# Patient Record
Sex: MALE | Race: WHITE | Employment: OTHER | URBAN - METROPOLITAN AREA
[De-identification: names, ages, dates, MRNs, and addresses within clinical notes are randomized per-mention and may not be internally consistent; named-entity substitution may affect disease eponyms.]

---

## 2022-07-06 ENCOUNTER — OFFICE VISIT (OUTPATIENT)
Dept: SURGERY | Age: 57
End: 2022-07-06
Payer: COMMERCIAL

## 2022-07-06 VITALS
OXYGEN SATURATION: 96 % | HEIGHT: 65 IN | BODY MASS INDEX: 29.16 KG/M2 | TEMPERATURE: 98 F | WEIGHT: 175 LBS | HEART RATE: 76 BPM | DIASTOLIC BLOOD PRESSURE: 85 MMHG | SYSTOLIC BLOOD PRESSURE: 134 MMHG

## 2022-07-06 DIAGNOSIS — K60.3 ANAL FISTULA: Primary | ICD-10-CM

## 2022-07-06 DIAGNOSIS — D12.5 ADENOMATOUS POLYP OF SIGMOID COLON: ICD-10-CM

## 2022-07-06 PROCEDURE — G8419 CALC BMI OUT NRM PARAM NOF/U: HCPCS | Performed by: SURGERY

## 2022-07-06 PROCEDURE — 1036F TOBACCO NON-USER: CPT | Performed by: SURGERY

## 2022-07-06 PROCEDURE — 99204 OFFICE O/P NEW MOD 45 MIN: CPT | Performed by: SURGERY

## 2022-07-06 PROCEDURE — G8427 DOCREV CUR MEDS BY ELIG CLIN: HCPCS | Performed by: SURGERY

## 2022-07-06 PROCEDURE — 3017F COLORECTAL CA SCREEN DOC REV: CPT | Performed by: SURGERY

## 2022-07-06 RX ORDER — OMEGA-3S/DHA/EPA/FISH OIL/D3 300MG-1000
400 CAPSULE ORAL DAILY
COMMUNITY

## 2022-07-06 RX ORDER — ATORVASTATIN CALCIUM 20 MG/1
TABLET, FILM COATED ORAL
COMMUNITY
Start: 2022-05-20

## 2022-07-06 RX ORDER — VITAMIN B COMPLEX
1 CAPSULE ORAL DAILY
COMMUNITY

## 2022-07-06 NOTE — LETTER
[facility] [address] [place] [address] [phone]  f [phone]    Jaquelin Porter MD                        SURGERY ORDER   -- Time of order -- 22    7:46 AM    Facility:   [facility]. # _________________                                                                                    Scheduled By:____________                  Surgery Date & Time: 2022 at 8:15am                                      Pt arrival: 6:15AM                                                                                      Patient Name:  Alvaro Thompson     :  1965     PCP:  Gabe Gregory DO      Home Ph:    [phone] (home) [phone] (work)                                                    PROCEDURE:  Exam under anesthesia, fistulotomy versus seton placement, possible rectal biopsy 76619, 55714, 40004, 17355    DIAGNOSIS:      ICD-10-CM    1. Anal fistula  K60.3    2. Adenomatous polyp of sigmoid colon  D12.5        Anesthesia: _General    Time Needed:  15 minutes    Pt Position:  prone/eduar-knife    Bowel Prep: fleets enema         Outpatient _x__ Admit ___                  Pre-Op to be done by: __PCP (if able)___    Cardiac Clearance Done by: ____N/A____    Medications to be stopped 5 days before surgery: ____n/a_____    Additional / Special Orders:                                                                                                                                                                                                        Jaquelin Porter MD  Insurance:                                ID #                                        Ph #     (secondary ?)       Date called ______        Manny Romeor to: _____ @ _____           Precert Needed?  Yes  /  No    PreAuth # & Details _______________________________________________________                        ______ Aniya Law to [facility] Insurance Verification _[phone]_      Post Op_________              ____Inst given                 _____ Bary Bare to Pt/Spouse

## 2022-07-07 RX ORDER — SODIUM CHLORIDE 0.9 % (FLUSH) 0.9 %
5-40 SYRINGE (ML) INJECTION PRN
Status: CANCELLED | OUTPATIENT
Start: 2022-07-07

## 2022-07-07 RX ORDER — SODIUM CHLORIDE 9 MG/ML
INJECTION, SOLUTION INTRAVENOUS PRN
Status: CANCELLED | OUTPATIENT
Start: 2022-07-07

## 2022-07-07 RX ORDER — SODIUM CHLORIDE 0.9 % (FLUSH) 0.9 %
5-40 SYRINGE (ML) INJECTION EVERY 12 HOURS SCHEDULED
Status: CANCELLED | OUTPATIENT
Start: 2022-07-07

## 2022-07-07 NOTE — PROGRESS NOTES
provided as appropriate. REVIEW OF SYSTEMS:    Pertinent positives and negatives are mentioned in the HPI above. Otherwise, all other systems were reviewed and negative. Objective:     Physical Exam   /85   Pulse 76   Temp 98 °F (36.7 °C) (Infrared)   Ht 5' 4.5\" (1.638 m)   Wt 175 lb (79.4 kg)   SpO2 96%   BMI 29.57 kg/m²   Constitutional: Appears well-developed and well-nourished. Grooming appropriate. No gross deformities. Body mass index is 29.57 kg/m². Eyes: No scleral icterus. Conjunctiva/lids normal. Vision intact grossly. Pupils equal/symmetric, reactive bilaterally. ENT: External ears/nose without defect, scars, or masses. Hearing grossly intact. No facial deformity. Lips normal, normal dentition. Neck: No masses. Trachea midline. No crepitus. Thyroid not enlarged. Cardiovascular: Normal rate. No peripheral edema. Abdominal aorta normal size to palpation. Pulmonary/Chest: Effort normal. No respiratory distress. No wheezes. No use of accessory muscles. Musculoskeletal: Normal range of motion x all 4 extremities and head/neck, without deformity, pain, or crepitus, with normal strength and tone. Normal gait. Nails without clubbing or cyanosis. Neurological: Alert and oriented to person, place, and time. No gross deficits. Sensation intact. Skin: Skin is dry. No rashes noted. No pallor. No induration of nodules. Psychiatric: Normal mood and affect. Behavior normal. Oriented to person, place, and time. Judgment and insight reasonable. Abdominal/wound: Soft, nontender, nondistended    Anorectal Exam: Chaperone present in room throughout exam.  Patient placed in the left lateral position. Buttocks spread. Digital rectal exam performed with lubricated finger. Anoscopy performed. Findings reveal: Posterior midline defect just outside the anal verge, consistent with external side of the fistula. Internal exam a bit limited due to stool.     Labs reviewed: Reviewed outside hospital pathology report  Radiology reviewed: None    Last colonoscopy: 2021, Em [facility] [place], operated by [facility]; reviewed outside hospital colonoscopy report and pathology report      Assessment/Plan:     A/P:  New problem(s) with uncertain prognosis: Anal fistula  Established problem(s): Colon polyp  Additional workup/treatment planned: Examination under anesthesia with fistulotomy versus seton placement  Risk of complications/morbidity: Moderate    I discussed with . Mindy Hurd the pathophysiology, etiology, work-up, treatment options, natural history of anal fistula. Discussed with him it is unlikely that this will resolve without surgical intervention. Discussed with him differences between simple and complex fistulas, including surgical management. Discussed fistulotomy as the mainstay of simple fistulas which is a fairly high resolution rate. Also discussed other options such as seton placement, mucosal advancement flap, lift procedure, in the case of complex fistulas. We also discussed that long-term chronic fistulas left alone over the course of decades can occasionally turn into malignancy. He is interested in surgical intervention, but is unsure about his personal schedule and timing. I will have my  reach out to him early next week. Again discussed that there is no immediate urgency, but he continues to be at risk for recurrent perianal abscesses. I had a discussion with the patient regarding the risks, benefits, and alternatives of the procedure. Risks include, but are not limited to: bleeding, infection, missed lesions, need for additional procedures or operations, thrombosis of external hemorrhoids, urinary retention, pelvic sepsis, and sphincter stretch or damage, which could result in temporary or rarely permanent incontinence to stool or flatus. Anesthesia options discussed. Postoperative pain discussed, and time away from work or usual daily activities discussed.  All questions answered to patient's satisfaction. Continue with current medications    I provided written information in the After Visit Summary AVS Regarding: anal fistula    DISPOSITION:  Surgery when ready    My findings will be relayed to consulting practitioner or PCP via Epic  Note completed using dictation software, please excuse any errors.     Electronically signed by Del Sterling MD on 7/7/2022 at 7:39 AM

## 2022-07-07 NOTE — PATIENT INSTRUCTIONS
ANAL FISTULA: Information and treatment options      A fistula is an abnormal connection between the inside of the anus or rectum and another structure, most commonly the skin around the anus. This results in continuous irritation and inflammation of the tract, which can cause recurrent infections and abscesses/boils. This can also result in pain, bleeding, and difficulty keeping clean. The cause of most fistula disease is unknown. Rarely, it can be a sign of other problems, such as Crohn's disease, or a result from previous radiation or surgical treatments in the anus or rectal region. Most anal fistulas require surgery. Antibiotics are generally not helpful, and creams and suppositories are also ineffective. There are various surgical options, depending on the course of the fistula tract. Usually the best surgical option is not determined until the actual time of surgery, in the operating room. This is determined by estimating how much of the internal and external sphincter muscles are involved in the fistula. The external sphincter muscle is under our conscious control (voluntary) and is the main muscle involved in controlling our defecation (stool control). The internal sphincter is under automatic control, and is much less critical in controlling defecation. Typically, only minimal workup (an office visit and exam) is required before proceeding with an operation, as most fistulas are best evaluated at the time of surgery. Occasionally, an MRI is ordered in the case of recurrent or complex fistula disease. Colonoscopy may be recommended if there is any suspicion for Crohn's Disease, such as chronic diarrhea, rectal bleeding, abdominal pain, unintended weight loss, or if you have a family history of Crohn's Disease or Ulcerative Colitis. If the fistula contains only internal sphincter muscle or no muscle at all, often a procedure called a fistulotomy is performed.  In this procedure, a probe is used to define the exact path, and the tissue and skin on top of the probe is opened up, and the fistula is allowed to heal from the inside out. This may take 2-4 weeks to completely heal, but results in excellent (95%) long term success. If the fistula contains various amount of the external sphincter muscle, other options may be chosen, depending on factors such as your age, gender, previous surgeries, previous sphincter tears or repairs, and ability to heal:  · Seton Placement: In this procedure, a rubber band-like object (\"seton\") is passed through the fistula tract and loosely tied to itself. This is used to prevent further infection and promote drainage. These are typically used as a bridge (for 2-4 months) to a more complex procedure, but can be left in indefinitely if needed. Setons are inert and do not interfere with bowel movements or daily activities. · LIFT (Ligation of Intersphincteric Fistula Tract): In this procedure, an incision is made between the internal and external sphincter muscles without the need to cut either one. The fistula is found, sutured closed, and cut. The skin side of the fistula is left open to heal from the inside out and to allow for drainage during the healing process. Though this procedure does require an incision into normal tissue, it minimizes the risk of damage to the external sphincter muscle, which makes the risk of incontinence very low. Long term success rates range from 60-80%. This procedure is usually preceded by a seton for 2-4 months. · Mucosal Advancement Flap: In this procedure, a small amount of your rectal tissue is freed up and stitched over the internal opening of the fistula. The skin side is left to heal from the inside out and to allow for drainage during healing. There is minimal risk of incontinence with this procedure. Long term success rates range from 60-80%. This procedure is usually preceded by a seton for 2-4 months. · Fistula Plug:  In this procedure, a small plug, made of porcine derivatives is used to fill the space inside the tract, to promote tissue ingrowth to allow it to heal. The plug will eventually dissolve. There is no risk of incontinence as no muscle is cut. Long term success rates range from 40-50%. These procedures are typically done in the operating room with sedation. All are same-day procedures and you will go home a few hours later. How can you increase your chance of successful healing:  · DO NOT smoke or use tobacco products  · If you are diabetic, keep your blood sugars well-controlled  · Avoid constipation and hard bowel movements  · Exercise daily, and maintain a healthy weight    Preparation for Surgery:  · Be sure to ask your doctor about any medications that you take on a regular basis. There may be special instructions regarding any blood thinning medications (such as aspirin, Plavix, or Coumadin), and any medications for diabetes. · Do not eat or drink anything after midnight, with the exception of sips of water with your regular medications the morning of surgery  · Please perform a fleets enema 1-2 hours before your scheduled arrival time. This can be acquired over the counter at most drug stores. We can provide one for you free of charge in the office if needed. · If you have any questions, please call the office at [phone]    Surgery Expectations:  · Please arrange for someone to drive you. You will be receiving sedation and it is illegal to drive yourself home. · Please arrive 2 hours before your scheduled surgery time. · You will go through registration, receive an IV, and meet the anesthesia provider  · You will be offered various anesthetic options, including IV sedation (\"twilight\"), local anesthetic injection, and a spinal injection (this will make you numb from the hips and downward - similar to an epidural used for childbirth). General anesthesia is offered for more complex procedures.   · Your surgeon will see you 10-15 minutes before your procedure to insure all of your questions and concerns have been addressed  · During the procedure, a flexible sigmoidoscopy will be performed - this is similar to a colonoscopy, but much shorter, to examine the inside of the rectum  · Next, retractors will be used to examine the fistula and determine which of the above options is best for you. · You will spend anywhere from 1 to 3 hours in the recovery area to insure sedation has worn off and you are safe to go home. After the procedure:  · You may have some drainage or a small amount of bleeding depending on the specifics of the procedure. · Depending on which procedure was done, you may have pain and discomfort. Please see the POST-OP Instructions on recommendations to control pain. · Keep your stools soft with plenty of fiber, water, and healthy fruits and vegetables. MiraLax and colace can be used as needed. · Warm water soaks (5-10 minutes) or gentle cleansing with a showerhead should be done twice daily and after bowel movements to keep the area clean. · Pathology/biopsy results are typically discussed at your postoperative visit. Risks and potential complications  · Fistula recurrence    · Potential need for future surgery  · Pain  · Rectal bleeding  · Difficulty with urinating (uncommon)  · Temporary changes in your ability to control stool or gas (uncommon)  · Permanent changes in your ability to control stool or gas (rare)  · Infections requiring emergency surgery and colostomy (very rare)    When should you call the office? · You have any questions or concerns. · You don't understand how to prepare for your procedure. · You have excessive bleeding, fever, chills, or inability to urinate  · You need to reschedule or have changed your mind about having the procedure.   · Dr Ming Young office phone # is [phone]  · If you are unable to reach the office (outside of normal business hours) and you have any concerns, go to your nearest emergency room.

## 2022-07-14 ENCOUNTER — TELEPHONE (OUTPATIENT)
Dept: SURGERY | Age: 57
End: 2022-07-14

## 2022-07-14 NOTE — TELEPHONE ENCOUNTER
Patient has been scheduled for:    Procedure: EUA, Fistulotomy vs seton, rectal biopsy  Date: 8/29/22  Time: 8:15AM  Arrival: 6:15AM  [facility]  [facility] n/a  Prep? Npo, fleets    Pre-op? pcp    Post-op Appt? 9/20/22 at 1:00pm     Patient advised they will need a . Orders routed to surgery scheduling. Instructions have been mailed/emailed to: [email or web address]

## 2022-08-02 ENCOUNTER — TELEPHONE (OUTPATIENT)
Dept: SURGERY | Age: 57
End: 2022-08-02

## 2022-08-02 NOTE — TELEPHONE ENCOUNTER
Left message for patient to call back. I did already remove this from outlook so I can use the spot. Still need to route to scheduling once I talk to him and verify.

## 2022-08-09 NOTE — TELEPHONE ENCOUNTER
Procedure rescheduled  9/12/22  12:45pm  Arrive 10:45AM  (Patient prefers earlier, we will move up if someone cancels)  Was going over information, patient said he had everything, he was trying to board his flight. I emailed a new document with updated information including post-op. Patient seeing PCP on 8/30/22    Routed new letter to scheduling, updated outlook.

## 2022-08-24 ENCOUNTER — TELEPHONE (OUTPATIENT)
Dept: SURGERY | Age: 57
End: 2022-08-24

## 2022-08-24 NOTE — TELEPHONE ENCOUNTER
Patient has been rescheduled for surgery on 10/31/22 at 7:15AM  Arrival 5:30AM  Same instructions, I emailed him the new information. Updated outlook and sent to letter to surgery scheduling. Also updated post-op appointment.

## 2022-08-25 ENCOUNTER — TELEPHONE (OUTPATIENT)
Dept: SURGERY | Age: 57
End: 2022-08-25

## 2022-08-25 NOTE — TELEPHONE ENCOUNTER
Patient called to reschedule because he could not obtain pre-op clearance for surgery on 10/31.     Spoke with , reviewed patient's chart, he does not need a pre-op physical, we will do this DOS

## 2022-10-27 ENCOUNTER — TELEPHONE (OUTPATIENT)
Dept: SURGERY | Age: 57
End: 2022-10-27

## 2022-10-27 NOTE — PROGRESS NOTES
Place patient label inside box (if no patient label, complete below)  Name:  :  MR#:   Charlotte Frederick / PROCEDURE  I (we), BHARGAVI MARTINEZ (Patient Name) authorize DR. Wayne Kim (Provider / [facility]) and/or such assistants as may be selected by him/her, to perform the following operation/procedure(s): EXAM UNDER ANESTHESIA, FISTULOTOMY VERSUS SETON PLACEMENT, POSSIBLE RECTAL BIOPSY       Note: If unable to obtain consent prior to an emergent procedure, document the emergent reason in the medical record. This procedure has been explained to my (our) satisfaction and included in the explanation was: The intended benefit, nature, and extent of the procedure to be performed; The significant risks involved and the probability of success; Alternative procedures and methods of treatment; The dangers and probable consequences of such alternatives (including no procedure or treatment); The expected consequences of the procedure on my future health; Whether other qualified individuals would be performing important surgical tasks and/or whether  would be present to advise or support the procedure. I (we) understand that there are other risks of infection and other serious complications in the pre-operative/procedural and postoperative/procedural stages of my (our) care. I (we) have asked all of the questions which I (we) thought were important in deciding whether or not to undergo treatment or diagnosis. These questions have been answered to my (our) satisfaction. I (we) understand that no assurance can be given that the procedure will be a success, and no guarantee or warranty of success has been given to me (us).     It has been explained to me (us) that during the course of the operation/procedure, unforeseen conditions may be revealed that necessitate extension of the original procedure(s) or different procedure(s) than those set forth in Paragraph 1. I (we) authorize and request that the above-named physician, his/her assistants or his/her designees, perform procedures as necessary and desirable if deemed to be in my (our) best interest.     Revised 8/2/2021                                                                          Page 1 of 2       I acknowledge that health care personnel may be observing this procedure for the purpose of medical education or other specified purposes as may be necessary as requested and/or approved by my (our) physician. I (we) consent to the disposal by the hospital Pathologist of the removed tissue, parts or organs in accordance with hospital policy. I do ____ do not ____ consent to the use of a local infiltration pain blocking agent that will be used by my provider/surgical provider to help alleviate pain during my procedure. I do ____ do not ____ consent to an emergent blood transfusion in the case of a life-threatening situation that requires blood components to be administered. This consent is valid for 24 hours from the beginning of the procedure. This patient does ____ or does not ____ currently have a DNR status/order. If DNR order is in place, obtain Addendum to the Surgical Consent for ALL Patients with a DNR Order to address shanice-operative status for limited intervention or DNR suspension.      I have read and fully understand the above Consent for Operation/Procedure and that all blanks were completed before I signed the consent.   _____________________________       _____________________      ____/____am/pm  Signature of Patient or legal representative      Printed Name / Relationship            Date / Time   ____________________________       _____________________      ____/____am/pm  Witness to Signature                                    Printed Name                    Date / Time    If patient is unable to sign or is a minor, complete the following)  Patient is a minor, ____ years of age, or unable to sign because:   ______________________________________________________________________________________________    If a phone consent is obtained, consent will be documented by using two health care professionals, each affirming that the consenting party has no questions and gives consent for the procedure discussed with the physician/provider.   _____________________          ____________________       _____/_____am/pm   2nd witness to phone consent        Printed name           Date / Time    Informed Consent:  I have provided the explanation described above in section 1 to the patient and/or legal representative.  I have provided the patient and/or legal representative with an opportunity to ask any questions about the proposed operation/procedure.   ___________________________          ____________________         ____/____am/pm  Provider / Proceduralist                            Printed name            Date / Time  Revised 8/2/2021                                                                      Page 2 of 2

## 2022-10-27 NOTE — PROGRESS NOTES

## 2022-10-27 NOTE — TELEPHONE ENCOUNTER
I have placed a reminder call to patient for upcoming procedure. Did you speak directly to patient or leave a voicemail? Voicemail    Prep? NPO 12AM  Fleets enema    Must have a  that is over the age of 25. Must be a friend or family member that can be responsible for signing them out after surgery.     Arrive at the main entrance [facility] at 5:30am

## 2022-10-28 ENCOUNTER — ANESTHESIA EVENT (OUTPATIENT)
Dept: OPERATING ROOM | Age: 57
End: 2022-10-28
Payer: COMMERCIAL

## 2022-10-31 ENCOUNTER — HOSPITAL ENCOUNTER (OUTPATIENT)
Age: 57
Setting detail: OUTPATIENT SURGERY
Discharge: HOME | End: 2022-10-31
Attending: SURGERY | Admitting: SURGERY
Payer: COMMERCIAL

## 2022-10-31 ENCOUNTER — ANESTHESIA (OUTPATIENT)
Dept: OPERATING ROOM | Age: 57
End: 2022-10-31
Payer: COMMERCIAL

## 2022-10-31 VITALS
OXYGEN SATURATION: 97 % | SYSTOLIC BLOOD PRESSURE: 117 MMHG | HEART RATE: 56 BPM | DIASTOLIC BLOOD PRESSURE: 82 MMHG | HEIGHT: 65 IN | TEMPERATURE: 97.6 F | BODY MASS INDEX: 29.32 KG/M2 | RESPIRATION RATE: 17 BRPM | WEIGHT: 176 LBS

## 2022-10-31 DIAGNOSIS — G89.18 POST-OP PAIN: Primary | ICD-10-CM

## 2022-10-31 DIAGNOSIS — K60.3 ANAL FISTULA: ICD-10-CM

## 2022-10-31 DIAGNOSIS — D12.5 ADENOMATOUS POLYP OF SIGMOID COLON: ICD-10-CM

## 2022-10-31 PROCEDURE — 2500000003 HC RX 250 WO HCPCS: Performed by: NURSE ANESTHETIST, CERTIFIED REGISTERED

## 2022-10-31 PROCEDURE — 2580000003 HC RX 258: Performed by: SURGERY

## 2022-10-31 PROCEDURE — 7100000011 HC PHASE II RECOVERY - ADDTL 15 MIN: Performed by: SURGERY

## 2022-10-31 PROCEDURE — 46275 REMOVE ANAL FIST INTER: CPT | Performed by: SURGERY

## 2022-10-31 PROCEDURE — 2709999900 HC NON-CHARGEABLE SUPPLY: Performed by: SURGERY

## 2022-10-31 PROCEDURE — A4217 STERILE WATER/SALINE, 500 ML: HCPCS | Performed by: SURGERY

## 2022-10-31 PROCEDURE — 3700000001 HC ADD 15 MINUTES (ANESTHESIA): Performed by: SURGERY

## 2022-10-31 PROCEDURE — 7100000000 HC PACU RECOVERY - FIRST 15 MIN: Performed by: SURGERY

## 2022-10-31 PROCEDURE — 6360000002 HC RX W HCPCS: Performed by: NURSE ANESTHETIST, CERTIFIED REGISTERED

## 2022-10-31 PROCEDURE — 2580000003 HC RX 258: Performed by: ANESTHESIOLOGY

## 2022-10-31 PROCEDURE — 7100000010 HC PHASE II RECOVERY - FIRST 15 MIN: Performed by: SURGERY

## 2022-10-31 PROCEDURE — 7100000001 HC PACU RECOVERY - ADDTL 15 MIN: Performed by: SURGERY

## 2022-10-31 PROCEDURE — 3600000013 HC SURGERY LEVEL 3 ADDTL 15MIN: Performed by: SURGERY

## 2022-10-31 PROCEDURE — 3600000003 HC SURGERY LEVEL 3 BASE: Performed by: SURGERY

## 2022-10-31 PROCEDURE — 45100 BIOPSY OF RECTUM: CPT | Performed by: SURGERY

## 2022-10-31 PROCEDURE — 2500000003 HC RX 250 WO HCPCS: Performed by: SURGERY

## 2022-10-31 PROCEDURE — 3700000000 HC ANESTHESIA ATTENDED CARE: Performed by: SURGERY

## 2022-10-31 PROCEDURE — 88305 TISSUE EXAM BY PATHOLOGIST: CPT

## 2022-10-31 RX ORDER — HYDRALAZINE HYDROCHLORIDE 20 MG/ML
10 INJECTION INTRAMUSCULAR; INTRAVENOUS
Status: ACTIVE | OUTPATIENT
Start: 2022-10-31

## 2022-10-31 RX ORDER — KETAMINE HCL IN NACL, ISO-OSM 20 MG/2 ML
SYRINGE (ML) INJECTION PRN
Status: DISCONTINUED | OUTPATIENT
Start: 2022-10-31 | End: 2022-10-31 | Stop reason: SDUPTHER

## 2022-10-31 RX ORDER — GLYCOPYRROLATE 0.2 MG/ML
INJECTION INTRAMUSCULAR; INTRAVENOUS PRN
Status: DISCONTINUED | OUTPATIENT
Start: 2022-10-31 | End: 2022-10-31 | Stop reason: SDUPTHER

## 2022-10-31 RX ORDER — ESMOLOL HYDROCHLORIDE 10 MG/ML
INJECTION INTRAVENOUS PRN
Status: DISCONTINUED | OUTPATIENT
Start: 2022-10-31 | End: 2022-10-31 | Stop reason: SDUPTHER

## 2022-10-31 RX ORDER — BUPIVACAINE HYDROCHLORIDE 5 MG/ML
INJECTION, SOLUTION EPIDURAL; INTRACAUDAL PRN
Status: DISCONTINUED | OUTPATIENT
Start: 2022-10-31 | End: 2022-10-31 | Stop reason: HOSPADM

## 2022-10-31 RX ORDER — LIDOCAINE HYDROCHLORIDE 20 MG/ML
INJECTION, SOLUTION INTRAVENOUS PRN
Status: DISCONTINUED | OUTPATIENT
Start: 2022-10-31 | End: 2022-10-31 | Stop reason: SDUPTHER

## 2022-10-31 RX ORDER — PROPOFOL 10 MG/ML
INJECTION, EMULSION INTRAVENOUS PRN
Status: DISCONTINUED | OUTPATIENT
Start: 2022-10-31 | End: 2022-10-31 | Stop reason: SDUPTHER

## 2022-10-31 RX ORDER — SODIUM CHLORIDE 0.9 % (FLUSH) 0.9 %
5-40 SYRINGE (ML) INJECTION PRN
Status: ACTIVE | OUTPATIENT
Start: 2022-10-31

## 2022-10-31 RX ORDER — KETOROLAC TROMETHAMINE 30 MG/ML
INJECTION, SOLUTION INTRAMUSCULAR; INTRAVENOUS PRN
Status: DISCONTINUED | OUTPATIENT
Start: 2022-10-31 | End: 2022-10-31 | Stop reason: SDUPTHER

## 2022-10-31 RX ORDER — LABETALOL HYDROCHLORIDE 5 MG/ML
10 INJECTION, SOLUTION INTRAVENOUS
Status: ACTIVE | OUTPATIENT
Start: 2022-10-31

## 2022-10-31 RX ORDER — SODIUM CHLORIDE, SODIUM LACTATE, POTASSIUM CHLORIDE, CALCIUM CHLORIDE 600; 310; 30; 20 MG/100ML; MG/100ML; MG/100ML; MG/100ML
INJECTION, SOLUTION INTRAVENOUS CONTINUOUS
Status: DISCONTINUED | OUTPATIENT
Start: 2022-10-31 | End: 2022-10-31 | Stop reason: HOSPADM

## 2022-10-31 RX ORDER — ONDANSETRON 2 MG/ML
4 INJECTION INTRAMUSCULAR; INTRAVENOUS
Status: ACTIVE | OUTPATIENT
Start: 2022-10-31 | End: 2022-11-01

## 2022-10-31 RX ORDER — OXYCODONE HYDROCHLORIDE 5 MG/1
5 TABLET ORAL EVERY 6 HOURS PRN
Qty: 28 TABLET | Refills: 0 | Status: SHIPPED | OUTPATIENT
Start: 2022-10-31 | End: 2022-11-07

## 2022-10-31 RX ORDER — FENTANYL CITRATE 50 UG/ML
25 INJECTION, SOLUTION INTRAMUSCULAR; INTRAVENOUS EVERY 5 MIN PRN
Status: ACTIVE | OUTPATIENT
Start: 2022-10-31

## 2022-10-31 RX ORDER — MIDAZOLAM HYDROCHLORIDE 1 MG/ML
INJECTION INTRAMUSCULAR; INTRAVENOUS PRN
Status: DISCONTINUED | OUTPATIENT
Start: 2022-10-31 | End: 2022-10-31 | Stop reason: SDUPTHER

## 2022-10-31 RX ORDER — DEXAMETHASONE SODIUM PHOSPHATE 4 MG/ML
INJECTION, SOLUTION INTRA-ARTICULAR; INTRALESIONAL; INTRAMUSCULAR; INTRAVENOUS; SOFT TISSUE PRN
Status: DISCONTINUED | OUTPATIENT
Start: 2022-10-31 | End: 2022-10-31 | Stop reason: SDUPTHER

## 2022-10-31 RX ORDER — SUCCINYLCHOLINE/SOD CL,ISO/PF 200MG/10ML
SYRINGE (ML) INTRAVENOUS PRN
Status: DISCONTINUED | OUTPATIENT
Start: 2022-10-31 | End: 2022-10-31 | Stop reason: SDUPTHER

## 2022-10-31 RX ORDER — FENTANYL CITRATE 50 UG/ML
INJECTION, SOLUTION INTRAMUSCULAR; INTRAVENOUS PRN
Status: DISCONTINUED | OUTPATIENT
Start: 2022-10-31 | End: 2022-10-31 | Stop reason: SDUPTHER

## 2022-10-31 RX ORDER — SODIUM CHLORIDE 0.9 % (FLUSH) 0.9 %
5-40 SYRINGE (ML) INJECTION PRN
Status: DISCONTINUED | OUTPATIENT
Start: 2022-10-31 | End: 2022-10-31 | Stop reason: HOSPADM

## 2022-10-31 RX ORDER — ONDANSETRON 2 MG/ML
INJECTION INTRAMUSCULAR; INTRAVENOUS PRN
Status: DISCONTINUED | OUTPATIENT
Start: 2022-10-31 | End: 2022-10-31 | Stop reason: SDUPTHER

## 2022-10-31 RX ORDER — SODIUM CHLORIDE 9 MG/ML
INJECTION, SOLUTION INTRAVENOUS PRN
Status: DISCONTINUED | OUTPATIENT
Start: 2022-10-31 | End: 2022-10-31 | Stop reason: HOSPADM

## 2022-10-31 RX ORDER — PROCHLORPERAZINE EDISYLATE 5 MG/ML
5 INJECTION INTRAMUSCULAR; INTRAVENOUS
Status: ACTIVE | OUTPATIENT
Start: 2022-10-31 | End: 2022-11-01

## 2022-10-31 RX ORDER — ROCURONIUM BROMIDE 10 MG/ML
INJECTION, SOLUTION INTRAVENOUS PRN
Status: DISCONTINUED | OUTPATIENT
Start: 2022-10-31 | End: 2022-10-31 | Stop reason: SDUPTHER

## 2022-10-31 RX ORDER — SODIUM CHLORIDE 0.9 % (FLUSH) 0.9 %
5-40 SYRINGE (ML) INJECTION EVERY 12 HOURS SCHEDULED
Status: ACTIVE | OUTPATIENT
Start: 2022-10-31

## 2022-10-31 RX ORDER — DOCUSATE SODIUM 100 MG/1
100 CAPSULE, LIQUID FILLED ORAL 2 TIMES DAILY
Qty: 30 CAPSULE | Refills: 0 | Status: SHIPPED | OUTPATIENT
Start: 2022-10-31 | End: 2022-11-15

## 2022-10-31 RX ORDER — MAGNESIUM HYDROXIDE 1200 MG/15ML
LIQUID ORAL CONTINUOUS PRN
Status: COMPLETED | OUTPATIENT
Start: 2022-10-31 | End: 2022-10-31

## 2022-10-31 RX ORDER — SODIUM CHLORIDE 0.9 % (FLUSH) 0.9 %
5-40 SYRINGE (ML) INJECTION EVERY 12 HOURS SCHEDULED
Status: DISCONTINUED | OUTPATIENT
Start: 2022-10-31 | End: 2022-10-31 | Stop reason: HOSPADM

## 2022-10-31 RX ORDER — SODIUM CHLORIDE 9 MG/ML
INJECTION, SOLUTION INTRAVENOUS PRN
Status: ACTIVE | OUTPATIENT
Start: 2022-10-31

## 2022-10-31 RX ADMIN — ONDANSETRON 4 MG: 2 INJECTION INTRAMUSCULAR; INTRAVENOUS at 08:06

## 2022-10-31 RX ADMIN — GLYCOPYRROLATE 0.4 MG: 0.2 INJECTION INTRAMUSCULAR; INTRAVENOUS at 08:17

## 2022-10-31 RX ADMIN — KETOROLAC TROMETHAMINE 30 MG: 30 INJECTION, SOLUTION INTRAMUSCULAR at 08:32

## 2022-10-31 RX ADMIN — SODIUM CHLORIDE, POTASSIUM CHLORIDE, SODIUM LACTATE AND CALCIUM CHLORIDE: 600; 310; 30; 20 INJECTION, SOLUTION INTRAVENOUS at 06:52

## 2022-10-31 RX ADMIN — FENTANYL CITRATE 50 MCG: 50 INJECTION, SOLUTION INTRAMUSCULAR; INTRAVENOUS at 08:14

## 2022-10-31 RX ADMIN — Medication 10 MG: at 08:22

## 2022-10-31 RX ADMIN — ROCURONIUM BROMIDE 5 MG: 10 INJECTION INTRAVENOUS at 07:47

## 2022-10-31 RX ADMIN — FENTANYL CITRATE 25 MCG: 50 INJECTION, SOLUTION INTRAMUSCULAR; INTRAVENOUS at 07:51

## 2022-10-31 RX ADMIN — FENTANYL CITRATE 25 MCG: 50 INJECTION, SOLUTION INTRAMUSCULAR; INTRAVENOUS at 08:00

## 2022-10-31 RX ADMIN — SUGAMMADEX 200 MG: 100 INJECTION, SOLUTION INTRAVENOUS at 08:24

## 2022-10-31 RX ADMIN — ESMOLOL HYDROCHLORIDE 10 MG: 10 INJECTION, SOLUTION INTRAVENOUS at 07:50

## 2022-10-31 RX ADMIN — Medication 10 MG: at 08:00

## 2022-10-31 RX ADMIN — PROPOFOL 200 MG: 10 INJECTION, EMULSION INTRAVENOUS at 07:47

## 2022-10-31 RX ADMIN — Medication 140 MG: at 07:49

## 2022-10-31 RX ADMIN — ROCURONIUM BROMIDE 25 MG: 10 INJECTION INTRAVENOUS at 07:53

## 2022-10-31 RX ADMIN — MIDAZOLAM HYDROCHLORIDE 2 MG: 2 INJECTION, SOLUTION INTRAMUSCULAR; INTRAVENOUS at 07:41

## 2022-10-31 RX ADMIN — DEXAMETHASONE SODIUM PHOSPHATE 8 MG: 4 INJECTION, SOLUTION INTRAMUSCULAR; INTRAVENOUS at 08:06

## 2022-10-31 RX ADMIN — LIDOCAINE HYDROCHLORIDE 100 MG: 20 INJECTION, SOLUTION INTRAVENOUS at 07:47

## 2022-10-31 NOTE — ANESTHESIA POSTPROCEDURE EVALUATION
Department of Anesthesiology  Postprocedure Note    Patient: Leanne Romeo  MRN: [id number]  YOB: 1965  Date of evaluation: 10/31/2022      Procedure Summary     Date: 10/31/22 Room / Location: [address] [place] [address]    Anesthesia Start: 7273 Anesthesia Stop: 0848    Procedures:       EXAM UNDER ANESTHESIA,  FISTULOTOMY RECTAL BIOPSY TO RULE OUT CR[facility] [address] [facility] [address] ([address] [place] [address])      . Diagnosis:       Anal fistula      Adenomatous polyp of sigmoid colon      (Anal fistula [K60.3])      (Adenomatous polyp of sigmoid colon [D12.5])    Surgeons: Gurvinder Villanueva MD Responsible Provider: Zina Mask, DO    Anesthesia Type: general ASA Status: 2          Anesthesia Type: No value filed.     Alicia Phase I: Alicia Score: 5    Alicia Phase II:        Anesthesia Post Evaluation    Patient location during evaluation: PACU  Patient participation: complete - patient participated  Level of consciousness: lethargic  Pain score: 2  Airway patency: patent  Nausea & Vomiting: no nausea and no vomiting  Complications: no  Cardiovascular status: hemodynamically stable  Respiratory status: acceptable  Hydration status: stable  Multimodal analgesia pain management approach

## 2022-10-31 NOTE — OP NOTE
OPERATIVE NOTE     Alexis Ch  1965  [id number]    DATE OF PROCEDURE: 10/31/22     PREOPERATIVE DIAGNOSES: Anal fistula     POSTOPERATIVE DIAGNOSES: Intersphincteric anal fistula     PROCEDURE:   1. EUA with fistulotomy  2. Rectal biopsy     SURGEON: MD Chelly Matos, PGY-1, resident     ANESTHESIA: GET. COMPLICATIONS: None. EBL: 5 cc    SPECIMEN: Rectal biopsy, rule out Crohn's disease     FINDINGS: Posterior midline intersphincteric fistula. INDICATIONS: The patient is a 59-year-old male who has had symptoms of recurrent perianal drainage and infection. Clinical exam in the office revealed likely anal fistula. Patient was recommended to undergo EUA, fistulotomy versus seton placement, rectal biopsy, any other indicated procedures. The risks, benefits, and alternatives of procedure were explained to the patient including risks of bleeding, infection, pelvic sepsis, unexpected findings, missed lesions, urinary retention, anal stenosis, and potential  sphincter damage and dysfunction, either temporary or permanent. Patient understood all of these risks and agreed to  proceed. Typical postoperative course was discussed, including pain and likely need for up to 3 weeks of recovery. PROCEDURE DETAILS:   The patient was brought to the operating theater. The patient was placed in the prone eduar-knife position after induction of anesthesia. Buttocks were taped apart carefully using tape. The patient's perianal region was prepped and draped in usual sterile fashion using Betadine prep solution. Time-out was performed confirming the patient's identity as well as the operative site. Antibiotics were not indicated. SCDs were on and functioning. All safety points were followed. Digital rectal exam and anoscopy was performed in all 4 quadrants using lighted anal retractor, noting: Posterior midline external wound at the anal verge.   Anoscopy revealed internal opening at the posterior midline dentate line. Probe easily passed from external to internal wound. Shoulder was palpated and noted to be intersphincteric, containing only internal sphincter without external sphincter involvement. Fistulotomy was performed over the probe with cautery. Base of wound was then curetted, irrigated, and cauterized. Distal rectal biopsy was performed using cautery to rule out Crohn's disease or any other abnormalities. Anoscopy was then performed again, wounds were irrigated, confirming complete hemostasis. 20 cc of local anesthetic was injected for perianal nerve block. The patient tolerated the procedure well, was woken up and brought to the PACU in stable condition. All counts were correct x2 at the end of the procedure. No complications. Bharati Salazar.  Kurt Chao MD    Electronically signed by Maggie Womack MD on 10/31/2022 at 8:40 AM no

## 2022-10-31 NOTE — PROGRESS NOTES
Pt complaint intermittent pus discharges when having BM. No blood discharges and no pain. Pt states slow to wake up after an anesthesia from his previous colonoscopy. Dr. Elon Brunner is at bedside. HR is in the 50's likewise, asymptomatic.

## 2022-10-31 NOTE — PROGRESS NOTES
Patient arrived from OR to PACU # 13 s/p  EXAM UNDER ANESTHESIA,  FISTULOTOMY RECTAL BIOPSY TO RULE OUT CROHN'S DISEASEA (Buttocks) per . Attached to PACU monitoring device, report received from CRNA who stated no problems intraoperatively. Arrived sedated with oral airway which was removed shortly upon awakening.

## 2022-10-31 NOTE — ANESTHESIA PRE PROCEDURE
Department of Anesthesiology  Preprocedure Note       Name:  Maris Prasad   Age:  62 y.o.  :  1965                                          MRN:  [id number]         Date:  10/31/2022      Surgeon: Karissa Spaulding):  Sulma Maldonado MD    Procedure: Procedure(s):  EXAM UNDER ANESTHESIA,  FISTULOTOMY VERSUS SETON PLACEMENT, POSSIBLE RECTAL BIOPSY  . Medications prior to admission:   Prior to Admission medications    Medication Sig Start Date End Date Taking? Authorizing Provider   atorvastatin (LIPITOR) 20 MG tablet TAKE 1 TABLET BY MOUTH EVERY DAY 22   Historical Provider, MD   vitamin D3 (CHOLECALCIFEROL) 10 MCG (400 UNIT) TABS tablet Take 400 Units by mouth daily    Historical Provider, MD   b complex vitamins capsule Take 1 capsule by mouth daily    Historical Provider, MD   multivitamin [facility]) per tablet Take 1 tablet by mouth daily. Historical Provider, MD       Current medications:    Current Facility-Administered Medications   Medication Dose Route Frequency Provider Last Rate Last Admin    lactated ringers infusion   IntraVENous Continuous Jefe Lorenzo  mL/hr at 10/31/22 0652 New Bag at 10/31/22 0652    sodium chloride flush 0.9 % injection 5-40 mL  5-40 mL IntraVENous 2 times per day Jefe Lorenzo MD        sodium chloride flush 0.9 % injection 5-40 mL  5-40 mL IntraVENous PRN Jefe Lorenzo MD        0.9 % sodium chloride infusion   IntraVENous PRN Jefe Lorenzo MD           Allergies:  No Known Allergies    Problem List:  There is no problem list on file for this patient.       Past Medical History:        Diagnosis Date    Disc disease, degenerative, lumbar or lumbosacral     Disc disorder of cervical region     H/O renal calculi         Hallux rigidus of right foot     Pain     Right foot; 6/10 on scale    Rhinitis, allergic     seasonal       Past Surgical History:        Procedure Laterality Date    FOOT SURGERY  2012    SHORTENING OSTEOTOMY WITH INTERNAL SCREW FIXATION RIGHT FOOT    KNEE SURGERY      Repair anterior tibial tubercle    TONSILLECTOMY         Social History:    Social History     Tobacco Use    Smoking status: Never    Smokeless tobacco: Never   Substance Use Topics    Alcohol use: Yes     Comment: occasionally                                Counseling given: Not Answered      Vital Signs (Current):   Vitals:    10/31/22 0607   BP: 119/85   Pulse: 53   Resp: 15   Temp: 97.2 °F (36.2 °C)   TempSrc: Temporal   SpO2: 96%   Weight: 176 lb (79.8 kg)   Height: 5' 4.5\" (1.638 m)                                              BP Readings from Last 3 Encounters:   10/31/22 119/85   07/06/22 134/85   11/07/12 117/68       NPO Status: Time of last liquid consumption: 1930                        Time of last solid consumption: 1930                        Date of last liquid consumption: 10/30/22                        Date of last solid food consumption: 10/30/22    BMI:   Wt Readings from Last 3 Encounters:   10/31/22 176 lb (79.8 kg)   07/06/22 175 lb (79.4 kg)   11/07/12 168 lb (76.2 kg)     Body mass index is 29.74 kg/m². CBC: No results found for: WBC, RBC, HGB, HCT, MCV, RDW, PLT    CMP: No results found for: NA, K, CL, CO2, BUN, CREATININE, GFRAA, AGRATIO, LABGLOM, GLUCOSE, GLU, PROT, CALCIUM, BILITOT, ALKPHOS, AST, ALT    POC Tests: No results for input(s): POCGLU, POCNA, POCK, POCCL, POCBUN, POCHEMO, POCHCT in the last 72 hours.     Coags: No results found for: PROTIME, INR, APTT    HCG (If Applicable): No results found for: PREGTESTUR, PREGSERUM, HCG, HCGQUANT     ABGs: No results found for: PHART, PO2ART, QRG3JGJ, UUY5DZJ, BEART, N9BTTKZL     Type & Screen (If Applicable):  No results found for: LABABO, LABRH    Drug/Infectious Status (If Applicable):  No results found for: HIV, HEPCAB    COVID-19 Screening (If Applicable): No results found for: COVID19        Anesthesia Evaluation  Patient summary reviewed and Nursing notes reviewed no history of anesthetic complications:   Airway: Mallampati: II  TM distance: >3 FB     Mouth opening: > = 3 FB   Dental: normal exam         Pulmonary:normal exam  breath sounds clear to auscultation  (+) sleep apnea: on CPAP,                             Cardiovascular:  Exercise tolerance: good (>4 METS),   (+) hyperlipidemia        Rhythm: regular  Rate: normal                    Neuro/Psych:   Negative Neuro/Psych ROS              GI/Hepatic/Renal:   (+) bowel prep,           Endo/Other: Negative Endo/Other ROS                    Abdominal:       Abdomen: soft. Vascular: negative vascular ROS. Other Findings:           Anesthesia Plan      general     ASA 2       Induction: intravenous. MIPS: Postoperative opioids intended and Prophylactic antiemetics administered. Anesthetic plan and risks discussed with patient. Plan discussed with CRNA.     Attending anesthesiologist reviewed and agrees with Preprocedure content                Shantell Irwin DO   10/31/2022

## 2022-10-31 NOTE — BRIEF OP NOTE
Brief Postoperative Note      Patient: Kristina Khan  YOB: 1965  MRN: [id number]    Date of Procedure: 10/31/2022    Pre-Op Diagnosis: Anal fistula [K60.3]  Adenomatous polyp of sigmoid colon [D12.5]    Post-Op Diagnosis: Same       Procedure(s):  EXAM UNDER ANESTHESIA,  FISTULOTOMY RECTAL BIOPSY TO RULE OUT CROHN'S DISEASEA  . Surgeon(s):  Asya Chahal MD    Assistant:  Resident: Marizol Galeana DO    Anesthesia: General    Estimated Blood Loss (mL): Minimal    Complications: None    Specimens:   ID Type Source Tests Collected by Time Destination   A : RECTAL BIOPSY (TO RULE OUT CROHN'S) Tissue Tissue SURGICAL PATHOLOGY Asya Chahal MD 10/31/2022 4768        Implants:  * No implants in log *      Drains: * No LDAs found *    Findings: Uncomplicated intersphincteric fistulotomy at 6 o'clock position. Rectal biopsy taken.       Electronically signed by Marizol Galeana DO on 10/31/2022 at 8:46 AM

## 2022-10-31 NOTE — H&P
Ginny Bustos    [id number]      Department of General Surgery    Surgical Services     Pre-operative History and Physical      INDICATION:   Anal fistula [K60.3]  Adenomatous polyp of sigmoid colon [D12.5]    Procedure(s):  EXAM UNDER ANESTHESIA,  FISTULOTOMY VERSUS SETON PLACEMENT, POSSIBLE RECTAL BIOPSY  . History obtained from: Patient interview and EHR     HISTORY:   The patient is a 62 y.o. male with a past medical and surgical history as delineated below. Patient with c/o perianal pain and intermittent drainage in the setting of anal fistula. His symptoms have been unrelieved with conservative treatment and he presents today for the above procedure with Dr. Michael Salazar. Weight loss/gain:  No  Known anesthesia problems:  No  Recent history of abnormal bleeding: No  Remote history of abnormal bleeding:No  Illness Screening: Patient denies fever, chills, worsening cough, or close contact with sick individuals. Past Medical History:        Diagnosis Date    Disc disease, degenerative, lumbar or lumbosacral     Disc disorder of cervical region     H/O renal calculi     2004    Hallux rigidus of right foot     Pain     Right foot; 6/10 on scale    Rhinitis, allergic     seasonal     Past Surgical History:        Procedure Laterality Date    FOOT SURGERY  11/07/2012    SHORTENING OSTEOTOMY WITH INTERNAL SCREW FIXATION RIGHT FOOT    KNEE SURGERY      Repair anterior tibial tubercle    TONSILLECTOMY         Medications Prior to Admission:   Prior to Admission medications    Medication Sig Start Date End Date Taking? Authorizing Provider   atorvastatin (LIPITOR) 20 MG tablet TAKE 1 TABLET BY MOUTH EVERY DAY 5/20/22   Historical Provider, MD   vitamin D3 (CHOLECALCIFEROL) 10 MCG (400 UNIT) TABS tablet Take 400 Units by mouth daily    Historical Provider, MD   b complex vitamins capsule Take 1 capsule by mouth daily    Historical Provider, MD   multivitamin [facility]) per tablet Take 1 tablet by mouth daily. Historical Provider, MD       Allergies:  Patient has no known allergies. REVIEW OF SYSTEMS:    Constitutional: Negative for chills, fatigue and fever   HENT: Negative for loss of hearing   Eyes: Negative for visual disturbance  Respiratory: Negative for shortness of breath and wheezing    Cardiovascular: Negative for chest pain, palpitations and exertional chest pressure  Gastrointestinal: Negative for constipation, diarrhea, nausea and vomiting   Musculoskeletal: Negative for gait problem, and joint swelling    Skin: Negative for rash   Neurological: Negative for dizziness, syncope, light-headedness    Hematological: Does not bruise/bleed easily   Psychiatric/Behavioral: Negative for agitation    PHYSICAL EXAM:      /85   Pulse 53   Temp 97.2 °F (36.2 °C) (Temporal)   Resp 15   Ht 5' 4.5\" (1.638 m)   Wt 176 lb (79.8 kg)   SpO2 96%   BMI 29.74 kg/m²  I      Eyes:  pupils equal, round and reactive to light and conjunctiva normal    Head/ENT:  Normocephalic, without obvious abnormality, atramatic,     Neck:  Supple, symmetrical, trachea midline, no adenopathy, no tenderness, skin warm and dry. Heart: Bradycardic, Regular rhythm    Lungs:  No increased work of breathing, good air exchange, clear to auscultation bilaterally, no crackles or wheezing    Abdomen:  Soft, non-distended, non-tender, no masses palpated    Extremities:  No clubbing, cyanosis, or edema      ASSESSMENT AND PLAN:    1. Patient is a 62 y.o. male with above specified procedure planned. 2.  Access to ancillary services are available per request of the provider.     DIMITRI Srinivasan - BLANCA   10/31/2022

## 2022-10-31 NOTE — PROGRESS NOTES
Ambulatory Surgery/Procedure Discharge Note    Vitals:    10/31/22 0928   BP: 117/82   Pulse: 56   Resp: 17   Temp: 97.6 °F (36.4 °C)   SpO2: 97%       In: 1000 [I.V.:1000]  Out: 5     Restroom use offered before discharge. Yes    Pain assessment:  level of pain (1-10, 10 severe)  Pt to [facility] post exam under anesthesia, fistulotomy rectal biopsy to rule out Crohn's disease. Moderate amount of blood noted to surgical gauze. Pt denies pain at this time. Pt denies nausea at this time, pt tolerating PO fluids and crackers well. Discharge instructions and written prescriptions given to pt's wife and she states understanding of these instructions. Pt states that he is \"ready to go. \"            Patient discharged to home/self care.  Patient discharged via wheel chair by transporter to waiting family/S.O.       10/31/2022 10:04 AM

## 2022-10-31 NOTE — DISCHARGE INSTRUCTIONS
YOUR PROCEDURE: FISTULOTOMY AND RECTAL BIOPSY       DR Gregoria Arroyo POST-OP INSTRUCTIONS AFTER ANORECTAL SURGERY    - A small amount of bleeding and drainage is perfectly normal after anorectal surgery. 1) Keep stools soft and avoid straining:  Eat 25-30 grams of fiber per day (use a fiber supplement such as Benefiber, Konsyl,  Metamucil, or store brand)  Drink plenty of water (4-6 glasses per day)  MiraLax as needed  Colace stool softeners as needed  Eat lots of fruits and vegetables and walk for at least 45 min per day. 2) Pain can be controlled with a combination of:  Tylenol - you may take up to 3000 mg per day (in the absence of liver disease)  NSAID medications such as motrin, ibuprofen, or advil (only if approved by your primary care physician)   Narcotic medications if needed, such as Percocet, Oxycodone, Lortab, Norco, etc.   Be careful, because narcotic medications can impair you and make you severely constipated, which can aggravate your wounds and incisions. No driving or operating machinery if taking narcotics, or if you cannot safely maneuver the vehicle without pain. If you are under the care of a pain physician or have an active pain management agreement, please touch base with your pain physician before and after surgery    3) Make sure you understand when to restart any blood thinners, such as aspirin, Coumadin/warfarin, Plavix, Eliquis, Xarelto, etc.    4) You should expect to have pain and discomfort for 2-3 weeks depending on what operation was done. You may also have some drainage of mucus and a small amount of bleeding. It is recommended to take 1-2 weeks off from work, depending on your comfort and specifics of your job. Please call the office if you need a note from the doctor. 5) Do sitz baths (warm soaks) for 5 min twice daily and after bowel movements, or use the stream from a shower head to gently cleanse your bottom.     6) You do not have any lifting restrictions, though you may find that certain movements and positions will cause increased pain. Walking is encouraged, and you may climb stairs. 7) If not already scheduled, please call the office the day after surgery to set up a post op appointment for approximately 3 weeks after surgery. If any tissue was sent for pathology, this will be discussed at your post-op visit, or you may call the office after 1-2 weeks for obtaining results sooner. Results can also typically be seen on the [employer] system. If you have any questions or concerns, please call the office during regular hours (M-F 8:30am-5:00pm). After hours, you may call the surgeon on call or come to the nearest ER. You should watch for excessive bleeding, fever, chills, nausea, vomiting, severe increase in pain, inability to urinate, or foul smelling or painful drainage. Dr Spencer Burgos office number: [phone]     [address] [place] [address]    There are potential side effects of anesthesia or sedation you may experience for the first 24 hours. These side effects include:    Confusion or Memory loss, Dizziness, or Delayed Reaction Times   [x]A responsible person should be with you for the next 24 hours. Do not operate any vehicles (automobiles, bicycles, motorcycles) or power tools or machinery for 24 hours. Do not sign any legal documents or make any legal decisions for 24 hours. Do not drink alcohol for 24 hours or while taking narcotic pain medication. Nausea    [x]Start with light diet and progress to your normal diet as you feel like eating. However, if you experience nausea or repeated episodes of vomiting which persist beyond 12-24 hours, notify your physician. Once nausea has passed, remember to keep drinking fluids. Difficulty Passing Urine  [x]Drink extra amounts of fluid today. Notify your physician if you have not urinated within 8 hours after your procedure or you feel uncomfortable.       Irritated Throat from a Breathing Tube  [x]Drink extra amounts of fluid today. Lozenges may help. Muscle Aches  [x]You may experience some generalized body aches as your muscles recover from medications used to relax them during surgery. These will gradually subside. MEDICATION INSTRUCTIONS:  [x]Prescription(S) x   2  sent with you. Use as directed. When taking pain medications, you may experience the side effect of dizziness or drowsiness. Do not drink alcohol or drive when taking these medications. []Prescription(S) x          Called to Pharmacy Name and location:    [x]Give the list of your medications to your primary care physician on your next visit. Keep your med list updated and carry it with in case of emergencies. Narcotic pain medications can cause the side effect of significant constipation. You may want to add a stool softener to your postoperative medication schedule or speak to your surgeon on how best to manage this side effect. NARCOTIC SAFETY:  Your pain medicine is only for you to take. Safely store your medicines. Store pills up high and out of reach of children and pets. Ensure safety caps are snapped tightly  Keep track of how many pills you have left    Unused medication can be disposed of by taking them to a drop-off box or take-back program that is authorized by the [facility]. Access to a site near you can be found on the [facility] [employer] website ([address] [facility]). If you have a CPAP machine, it is very important that you use it daily during all periods of sleep and daytime rest during your recovery at home. Surgery and Anesthesia place a significant amount of stress on your body. Using your CPAP will help keep you safe and lessen the negative effects of that stress. FOLLOW-UP RECOVERY CARE:  [x]Call the office at [phone] for follow-up appointment and problems    Watch for these possible complications, symptoms, or side effects of anesthesia.   Call physician if they or any other problems occur:  Signs of INFECTION   > Fever over 101°     > Redness, swelling, hardness or warmth at the operative site   >Foul smelling or cloudy drainage at the operative site   Unrelieved PAIN  Unrelieved NAUSEA  Blood soaked dressing. (Some oozing may be normal)  Inability to urinate      Numb, pale, blue, cold or tingling extremity      Physician:      The above instructions were reviewed with patient/significant other. The following additional patient specific information was reviewed with the patient/significant other:  [x]Procedure/physician specific instructions  []Medication information sheet(S) including potential side effects  []Bonnies egress test  []Pain Ball management  []FAQ Catheter associated blood stream infections  [x]FAQ Surgical Site Infections  []Other-    I have read and understand the instructions given to me: ____________________________________________   (Patient/S.O. Signature)            Date/time 10/31/2022 9:10 AM         PACU:  [phone]   M-F 700 AM - 7 PM      SAME DAY SERVICES:  [phone] M-F 7AM-6PM        If you smoke STOP. We care about your health!

## 2022-11-22 ENCOUNTER — OFFICE VISIT (OUTPATIENT)
Dept: SURGERY | Age: 57
End: 2022-11-22

## 2022-11-22 VITALS
OXYGEN SATURATION: 98 % | RESPIRATION RATE: 16 BRPM | HEART RATE: 67 BPM | SYSTOLIC BLOOD PRESSURE: 134 MMHG | DIASTOLIC BLOOD PRESSURE: 90 MMHG | TEMPERATURE: 98.4 F | BODY MASS INDEX: 30.16 KG/M2 | HEIGHT: 65 IN | WEIGHT: 181 LBS

## 2022-11-22 DIAGNOSIS — D12.5 ADENOMATOUS POLYP OF SIGMOID COLON: ICD-10-CM

## 2022-11-22 DIAGNOSIS — K60.3 ANAL FISTULA: Primary | ICD-10-CM

## 2022-11-22 PROCEDURE — 99024 POSTOP FOLLOW-UP VISIT: CPT | Performed by: SURGERY

## 2022-11-22 NOTE — PROGRESS NOTES
[facility] COLORECTAL SURGERY  [address]  Dept: [phone]  Dept Fax: [phone]  Loc: [phone]    Visit Date: 11/22/2022    Slava Estevez is a 62 y.o. male who presents today for: Post-Op Check (Post-op EUA with fistulotomy and Rectal biopsy 10/31/22-patient reports still having yellowish drainage that has an odor, denies bleeding, denies issues having BM )      Subjective: Slava Estevez is a 62 y.o. male here for postoperative visit after EUA, fistulotomy about 3 weeks ago. Overall doing well. Having some drainage, not unexpected    Patient's problem list, medications, past medical, surgical, family, and social histories were reviewed and updated in the chart as indicated today. Objective:     BP (!) 134/90   Pulse 67   Temp 98.4 °F (36.9 °C) (Infrared)   Resp 16   Ht 5' 4.5\" (1.638 m)   Wt 181 lb (82.1 kg)   SpO2 98%   BMI 30.59 kg/m²     Abdominal/wound: Inspected with chaperone in room. Fistulotomy site healing really nicely. No signs of infection or recurrence. Assessment/Plan:       ASSESSMENT/PLAN:    3-week status post EUA, fistulotomy. No episodes of incontinence. Wound seems to be healing very nicely. Discussed timeline of another month or so before complete healing. Reassured him the drainage perfectly normal.    DISPOSITION: Follow-up as needed    Note completed using dictation software, please excuse any errors. Referring/primary care physician updated through "[employer] note if PCP was listed.     Electronically signed by Rhea Elliott MD on 11/22/2022 at 9:23 AM

## 2023-01-10 ENCOUNTER — TELEPHONE (OUTPATIENT)
Dept: SURGERY | Age: 58
End: 2023-01-10

## 2023-01-10 NOTE — TELEPHONE ENCOUNTER
Patient would like to go over his surgical pathology results from his 10/31/2022 surgery    Please contact the patient at [phone]

## 2023-01-10 NOTE — TELEPHONE ENCOUNTER
Spoke to patient, per Dr. Alla Maldonado \"Completely negative for any abnormalities. No signs of Crohn's or precancer, etc.\". Patient will call if he needs anything else.

## (undated) DEVICE — GLOVE SURG SZ 7 CRM LTX FREE POLYISOPRENE POLYMER BEAD ANTI

## (undated) DEVICE — FLUID TRAP FOR MINIVAC ES EQUIP FLD TRAP

## (undated) DEVICE — TOWEL,STOP FLAG GOLD N-W: Brand: MEDLINE

## (undated) DEVICE — POSITIONER HD REST FOAM CMFRT TCH

## (undated) DEVICE — RECTAL: Brand: MEDLINE INDUSTRIES, INC.

## (undated) DEVICE — GAUZE FLUFF 1 PLY: Brand: DEROYAL

## (undated) DEVICE — PAD,ABDOMINAL,5"X9",ST,LF,25/BX: Brand: MEDLINE INDUSTRIES, INC.

## (undated) DEVICE — PAD,NON-ADHERENT,3X8,STERILE,LF,1/PK: Brand: MEDLINE

## (undated) DEVICE — SHEET,T,THYROID,STERILE: Brand: MEDLINE